# Patient Record
Sex: MALE | Race: WHITE
[De-identification: names, ages, dates, MRNs, and addresses within clinical notes are randomized per-mention and may not be internally consistent; named-entity substitution may affect disease eponyms.]

---

## 2018-02-28 ENCOUNTER — HOSPITAL ENCOUNTER (OUTPATIENT)
Dept: HOSPITAL 89 - CT | Age: 58
End: 2018-02-28
Attending: PHYSICIAN ASSISTANT
Payer: COMMERCIAL

## 2018-02-28 DIAGNOSIS — Z82.41: ICD-10-CM

## 2018-02-28 DIAGNOSIS — J92.9: Primary | ICD-10-CM

## 2018-02-28 DIAGNOSIS — J84.10: ICD-10-CM

## 2018-02-28 DIAGNOSIS — I70.0: ICD-10-CM

## 2018-02-28 PROCEDURE — 82040 ASSAY OF SERUM ALBUMIN: CPT

## 2018-02-28 PROCEDURE — 82247 BILIRUBIN TOTAL: CPT

## 2018-02-28 PROCEDURE — 84075 ASSAY ALKALINE PHOSPHATASE: CPT

## 2018-02-28 PROCEDURE — 84155 ASSAY OF PROTEIN SERUM: CPT

## 2018-02-28 PROCEDURE — 84520 ASSAY OF UREA NITROGEN: CPT

## 2018-02-28 PROCEDURE — 84132 ASSAY OF SERUM POTASSIUM: CPT

## 2018-02-28 PROCEDURE — 71275 CT ANGIOGRAPHY CHEST: CPT

## 2018-02-28 PROCEDURE — 84295 ASSAY OF SERUM SODIUM: CPT

## 2018-02-28 PROCEDURE — 82435 ASSAY OF BLOOD CHLORIDE: CPT

## 2018-02-28 PROCEDURE — 82947 ASSAY GLUCOSE BLOOD QUANT: CPT

## 2018-02-28 PROCEDURE — 84460 ALANINE AMINO (ALT) (SGPT): CPT

## 2018-02-28 PROCEDURE — 36415 COLL VENOUS BLD VENIPUNCTURE: CPT

## 2018-02-28 PROCEDURE — 82310 ASSAY OF CALCIUM: CPT

## 2018-02-28 PROCEDURE — 84450 TRANSFERASE (AST) (SGOT): CPT

## 2018-02-28 PROCEDURE — 82374 ASSAY BLOOD CARBON DIOXIDE: CPT

## 2018-02-28 PROCEDURE — 82565 ASSAY OF CREATININE: CPT

## 2018-02-28 NOTE — RADIOLOGY IMAGING REPORT
FACILITY: [facility] 

 

PATIENT NAME: Tan Turner

: 1960

MR: [id number]

V: [id number]

EXAM DATE: 

ORDERING PHYSICIAN: SABI GRANADO

TECHNOLOGIST: 

 

Location: [facility]

Patient: Tan Turner

: 1960

MRN: [id number]

Visit/Account:[id number]

Date of Sevice:  2018

 

ACCESSION #: [id number]

 

CTA CHEST WW/O [facility] (PULM ANG)

 

HISTORY:  Family history of sudden cardiac death, no chest complaints

 

ADDITIONAL HISTORY:  None.

 

TECHNIQUE:  CTA chest with and without intravenous contrast.  Axial imaging acquired following admini
stration of IV contrast timed for maximum opacification of the pulmonary arterial vasculature.  Slab 
3-D MIP reconstructed images were also created for further evaluation and interpretation. Reconstruct
ion of the source data set includes multiplanar 2-D in the sagittal and coronal planes and 3-D recons
tructed coronal slab MIP series.  3-D images were created by the technologist.

 

CONTRAST:  100  mL Isovue-[id number]

 

COMPARISON:  CTA chest 2008

 

FINDINGS:

 

Lungs/pleura:  There is mild pleural thickening seen over the upper lobes similar to the prior study.
.  There are small scattered calcified granulomas also similar to the prior study

 

Heart/vessels:  There is a small amount of mural thrombus in the proximal left subclavian artery ther
e is no evidence of a thoracic aortic aneurysm or dissection.  There is very mild soft plaque seen in
 the mid to distal descending thoracic aorta.  There are very mild atherosclerotic calcifications in 
the thoracic aorta and visualized proximal abdominal aorta and branch vessels including the coronary 
arteries.

 

There is a mild narrowing at the celiac trunk and a high-grade narrowing of the celiac trunk approxim
ate 1.5 cm beyond its origin

 

Mediastinum/lymph nodes:  Negative.

 

Visualized upper abdomen:  There is a 1 cm partially calcified nodule immediately contiguous with the
 left adrenal gland that appears similar to the prior study

 

Bones/soft tissues:  There are spondylotic changes of the thoracic spine

 

Additional findings:  None

 

IMPRESSION:

 

There is mild pleural thickening over the upper lobes bilaterally and scattered calcified granulomas 
some are to the prior examination

 

There is a small amount of mural thrombus/soft plaque in the proximal left subclavian artery.  There 
is also mild soft plaque/mural thrombus in the mid to distal descending thoracic aorta..

 

There is no demonstration of thoracic aortic aneurysm or dissection.

 

There is mild narrowing at the origin the celiac trunk and a high-grade narrowing of the celiac trunk
 approximately 1.5 cm beyond its origin

 

 

There is a 1 cm stable partially calcified left adrenal nodule

 

Report Dictated By: Renetta Pantoja MD at 2018 4:43 PM

 

Report E-Signed By: Renetta Pantoja MD  at 2018 5:07 PM

 

WSN:[id number]